# Patient Record
Sex: FEMALE | URBAN - METROPOLITAN AREA
[De-identification: names, ages, dates, MRNs, and addresses within clinical notes are randomized per-mention and may not be internally consistent; named-entity substitution may affect disease eponyms.]

---

## 2024-11-05 ENCOUNTER — TELEPHONE (OUTPATIENT)
Age: 24
End: 2024-11-05

## 2024-11-05 NOTE — TELEPHONE ENCOUNTER
Carmen Garcia,LPC HP / Therapist  [facility]  Psych[facility] Services   [address]  [place], [place] [address], [place]  [phone]    [facility]  [address] [place], [place] [address] [phone]      Tatiana Hawley or Rebekah Hernandez, [facility]  [address], [address] [address] [phone] (Call/Text)